# Patient Record
Sex: MALE | Race: WHITE | NOT HISPANIC OR LATINO | Employment: FULL TIME | URBAN - METROPOLITAN AREA
[De-identification: names, ages, dates, MRNs, and addresses within clinical notes are randomized per-mention and may not be internally consistent; named-entity substitution may affect disease eponyms.]

---

## 2018-03-26 ENCOUNTER — OFFICE VISIT (OUTPATIENT)
Dept: PHYSICAL MEDICINE AND REHAB | Facility: CLINIC | Age: 38
End: 2018-03-26
Payer: COMMERCIAL

## 2018-03-26 VITALS
DIASTOLIC BLOOD PRESSURE: 93 MMHG | SYSTOLIC BLOOD PRESSURE: 157 MMHG | HEART RATE: 73 BPM | WEIGHT: 235 LBS | HEIGHT: 67 IN | BODY MASS INDEX: 36.88 KG/M2

## 2018-03-26 DIAGNOSIS — M25.529 ELBOW PAIN, UNSPECIFIED LATERALITY: Primary | ICD-10-CM

## 2018-03-26 DIAGNOSIS — M77.11 RIGHT LATERAL EPICONDYLITIS: ICD-10-CM

## 2018-03-26 PROCEDURE — 99999 PR PBB SHADOW E&M-NEW PATIENT-LVL III: CPT | Mod: PBBFAC,,, | Performed by: PHYSICAL MEDICINE & REHABILITATION

## 2018-03-26 PROCEDURE — 99202 OFFICE O/P NEW SF 15 MIN: CPT | Mod: S$GLB,,, | Performed by: PHYSICAL MEDICINE & REHABILITATION

## 2018-03-26 RX ORDER — DICLOFENAC SODIUM 10 MG/G
2 GEL TOPICAL 3 TIMES DAILY
Qty: 1 TUBE | Refills: 2 | Status: ON HOLD | OUTPATIENT
Start: 2018-03-26 | End: 2018-11-16 | Stop reason: CLARIF

## 2018-03-26 NOTE — PROGRESS NOTES
[facility]    CHIEF COMPLAINT:   Chief Complaint   Patient presents with    Elbow Pain     right elbow pain     HISTORY OF PRESENT ILLNESS: Yan Watkins is a 37 y.o. male who presents to me for the first time for evaluation and treatment of right elbow pain.  He's had pain in the area for the past 2-3 weeks.  There was no specific injury or traumatic event.  He rates his pain as a 7 on a scale of 1-10.  He denies any significant swelling or redness.  He has been taking ibuprofen which helps temporarily.  He locates his pain over the lateral aspect of the right elbow.  There is no popping or snapping sensations.  The pain is increased with increased use of the right arm or hand and reduced with rest.    Review of Systems   Constitutional: Negative for fever.   HENT: Negative for drooling.    Eyes: Negative for discharge.   Respiratory: Negative for choking.    Cardiovascular: Negative for chest pain.   Genitourinary: Negative for flank pain.   Skin: Negative for wound.   Allergic/Immunologic: Negative for immunocompromised state.   Neurological: Negative for tremors and syncope.   Psychiatric/Behavioral: Negative for behavioral problems.     Past Medical History:   Past Medical History:   Diagnosis Date    Allergy        Past Surgical History: History reviewed. No pertinent surgical history.    Family History:   Family History   Problem Relation Age of Onset    Stroke Mother     COPD Mother     Stroke Father     Heart disease Father        Medications:   No current outpatient prescriptions on file prior to visit.     No current facility-administered medications on file prior to visit.        Allergies: Review of patient's allergies indicates:  No Known Allergies    Social History:   Social History     Social History    Marital status:      Spouse name: N/A    Number of children: N/A    Years of education: N/A     Social History Main Topics    Smoking status: Never Smoker  "   Smokeless tobacco: Never Used    Alcohol use Yes      Comment: socially    Drug use: Unknown    Sexual activity: Not Asked     Other Topics Concern    None     Social History Narrative    None     PHYSICAL EXAMINATION:   General    Vitals:    03/26/18 1511   BP: (!) 157/93   Pulse: 73   Weight: 106.6 kg (235 lb)   Height: 5' 7" (1.702 m)     Constitutional: Oriented to person, place, and time. No apparent distress. Appears well-developed and well-nourished. Pleasant.  HENT:   Head: Normocephalic and atraumatic.   Eyes: Right eye exhibits no discharge. Left eye exhibits no discharge. No scleral icterus.   Pulmonary/Chest: Effort normal. No respiratory distress.   Abdominal: There is no guarding.   Neurological: Alert and oriented to person, place, and time.   Psychiatric: Behavior is normal.   Right Elbow Exam     Tenderness   The patient is experiencing tenderness in the lateral epicondyle.     Range of Motion   Extension: 0   Flexion: 140   Pronation: normal   Supination: normal     Muscle Strength   Pronation:  5/5   Supination:  5/5     Tests Varus: negative  Valgus: negative  Tinel's Sign (cubital tunnel): negative    Other   Erythema: absent  Scars: absent  Sensation: normal  Pulse: present    Comments:  Positive Cozen's test      Left Elbow Exam     Tenderness   The patient is experiencing no tenderness.         Range of Motion   Extension: normal   Flexion: normal   Pronation: normal   Supination: normal     Muscle Strength   Pronation:  5/5   Supination:  5/5     Other   Erythema: absent  Scars: absent  Sensation: normal  Pulse: present        INSPECTION: There is no swelling, ecchymoses, erythema or gross deformity of the right elbow.    Imaging  Diagnostic Ultrasound Exam:  FINDINGS: Real time examination was performed. Selected static and dynamic images were obtained, and correlated with prior x-ray and other available imaging.  Limited exam of the right lateral elbow was performed today.  The " images were saved will be uploaded to EMR.  The common extensor tendon was observed in the long axis attaching upon the lateral epicondyles the humerus.  There was no lateral elbow joint effusion.  The lateral collateral ligament appear to be intact.  There was a mild degree of thickening of the proximal portion of the tendon.  It was a heterogenous appearance of the tendon substance with areas of hypoechoic echotexture within the proximal portion of the tendon.  There was a moderate degree of neovascularization seen within the tendon substance.  This area was very tender to sono palpation.     Data Reviewed: ultrasound    Supportive Actions: Independent visualization of images or test specimens    ASSESSMENT:   1. Elbow pain, unspecified laterality    2. Right lateral epicondylitis      PLAN:     1. Time was spent reviewing the above diagnosis in depth with Yan today, including acute management and rehabilitation.     2.  He has signs, symptoms, and diagnostic ultrasound findings all consistent with a diagnosis of right lateral epicondylitis.  I recommended conservative care initially in the form of formal physical therapy.  I also like the use of dry needling and scraping techniques to the area.  We will help facilitate him getting set up with physical therapy in the [place] area.      3. He does have an elbow strap and I encouraged him to use it properly.     4.  I prescribed Voltaren gel to apply topically 3 times a day as needed.    5. RTC in 6 weeks if not improved.    The above note was completed, in part, with the aid of Dragon dictation software/hardware. Translation errors may be present.

## 2018-05-11 ENCOUNTER — OFFICE VISIT (OUTPATIENT)
Dept: PHYSICAL MEDICINE AND REHAB | Facility: CLINIC | Age: 38
End: 2018-05-11
Payer: COMMERCIAL

## 2018-05-11 VITALS — BODY MASS INDEX: 36.88 KG/M2 | HEIGHT: 67 IN | WEIGHT: 235 LBS

## 2018-05-11 DIAGNOSIS — M77.11 RIGHT LATERAL EPICONDYLITIS: ICD-10-CM

## 2018-05-11 DIAGNOSIS — M25.521 RIGHT ELBOW PAIN: Primary | ICD-10-CM

## 2018-05-11 PROCEDURE — 99211 OFF/OP EST MAY X REQ PHY/QHP: CPT | Mod: 25,S$GLB,, | Performed by: PHYSICAL MEDICINE & REHABILITATION

## 2018-05-11 PROCEDURE — 99999 PR PBB SHADOW E&M-EST. PATIENT-LVL II: CPT | Mod: PBBFAC,,, | Performed by: PHYSICAL MEDICINE & REHABILITATION

## 2018-05-11 PROCEDURE — 20551 NJX 1 TENDON ORIGIN/INSJ: CPT | Mod: S$GLB,,, | Performed by: PHYSICAL MEDICINE & REHABILITATION

## 2018-05-11 PROCEDURE — 76942 ECHO GUIDE FOR BIOPSY: CPT | Mod: S$GLB,,, | Performed by: PHYSICAL MEDICINE & REHABILITATION

## 2018-05-11 RX ORDER — GLUCOSAMINE/CHONDR SU A SOD 167-133 MG
CAPSULE ORAL
COMMUNITY

## 2018-05-11 RX ORDER — BETAMETHASONE SODIUM PHOSPHATE AND BETAMETHASONE ACETATE 3; 3 MG/ML; MG/ML
6 INJECTION, SUSPENSION INTRA-ARTICULAR; INTRALESIONAL; INTRAMUSCULAR; SOFT TISSUE
Status: DISCONTINUED | OUTPATIENT
Start: 2018-05-11 | End: 2018-05-11 | Stop reason: HOSPADM

## 2018-05-11 RX ADMIN — BETAMETHASONE SODIUM PHOSPHATE AND BETAMETHASONE ACETATE 6 MG: 3; 3 INJECTION, SUSPENSION INTRA-ARTICULAR; INTRALESIONAL; INTRAMUSCULAR; SOFT TISSUE at 12:05

## 2018-05-11 NOTE — PROGRESS NOTES
[facility]    CHIEF COMPLAINT:   Chief Complaint   Patient presents with    Elbow Pain     right     HISTORY OF PRESENT ILLNESS: Yan Watkins is a 37 y.o. male who presents to me in follow-up regarding his right elbow pain.  At his last visit we had him established in formal physical therapy.  He has been doing this and completed 8 sessions.  They've been doing therapeutic exercise, stretching, dry needling, and medial scraping techniques.  He feels that he is made some progress, but still experiences consistent right elbow pain when using the right arm.    Review of Systems   Constitutional: Negative for fever.   HENT: Negative for drooling.    Eyes: Negative for discharge.   Respiratory: Negative for choking.    Cardiovascular: Negative for chest pain.   Genitourinary: Negative for flank pain.   Skin: Negative for wound.   Allergic/Immunologic: Negative for immunocompromised state.   Neurological: Negative for tremors and syncope.   Psychiatric/Behavioral: Negative for behavioral problems.     Past Medical History:   Past Medical History:   Diagnosis Date    Allergy        Past Surgical History: History reviewed. No pertinent surgical history.    Family History:   Family History   Problem Relation Age of Onset    Stroke Mother     COPD Mother     Stroke Father     Heart disease Father        Medications:   Current Outpatient Prescriptions on File Prior to Visit   Medication Sig Dispense Refill    diclofenac sodium (VOLTAREN) 1 % Gel Apply 2 g topically 3 (three) times daily. 1 Tube 2     No current facility-administered medications on file prior to visit.        Allergies: Review of patient's allergies indicates:  No Known Allergies    Social History:   Social History     Social History    Marital status:      Spouse name: N/A    Number of children: N/A    Years of education: N/A     Social History Main Topics    Smoking status: Never Smoker    Smokeless tobacco:  "Never Used    Alcohol use Yes      Comment: socially    Drug use: Unknown    Sexual activity: Not Asked     Other Topics Concern    None     Social History Narrative    None     PHYSICAL EXAMINATION:   General    Vitals:    05/11/18 1128   Weight: 106.6 kg (235 lb)   Height: 5' 7" (1.702 m)     Constitutional: Oriented to person, place, and time. No apparent distress. Appears well-developed and well-nourished. Pleasant.  HENT:   Head: Normocephalic and atraumatic.   Eyes: Right eye exhibits no discharge. Left eye exhibits no discharge. No scleral icterus.   Pulmonary/Chest: Effort normal. No respiratory distress.   Abdominal: There is no guarding.   Neurological: Alert and oriented to person, place, and time.   Psychiatric: Behavior is normal.   Right Elbow Exam     Tenderness   The patient is experiencing tenderness in the lateral epicondyle.     Range of Motion   Extension: 0   Flexion: 140   Pronation: normal   Supination: normal     Muscle Strength   Pronation:  5/5   Supination:  5/5     Tests Varus: negative  Valgus: negative  Tinel's Sign (cubital tunnel): negative    Other   Erythema: absent  Scars: absent  Sensation: normal  Pulse: present    Comments:  Positive Cozen's test      Left Elbow Exam     Tenderness   The patient is experiencing no tenderness.         Range of Motion   Extension: normal   Flexion: normal   Pronation: normal   Supination: normal     Muscle Strength   Pronation:  5/5   Supination:  5/5     Other   Erythema: absent  Scars: absent  Sensation: normal  Pulse: present        INSPECTION: There is no swelling, ecchymoses, erythema or gross deformity of the right elbow.    Imaging  Diagnostic Ultrasound Exam:  FINDINGS: Real time examination was performed. Selected static and dynamic images were obtained, and correlated with prior x-ray and other available imaging.  Limited exam of the right lateral elbow was performed today.  The images were saved will be uploaded to EMR.  The common " extensor tendon was observed in the long axis attaching upon the lateral epicondyle of the humerus.  There was no lateral elbow joint effusion.  The lateral collateral ligament appears to be intact.  There was a mild degree of thickening of the proximal portion of the tendon.  It was a heterogenous appearance of the tendon substance with areas of hypoechoic echotexture within the proximal portion of the tendon.  There was a moderate degree of neovascularization seen within the tendon substance.  This area was very tender to sono palpation.     Data Reviewed: ultrasound    Supportive Actions: Independent visualization of images or test specimens    ASSESSMENT:   1. Right elbow pain    2. Right lateral epicondylitis      PLAN:     1.  Unfortunately, he has responded minimally to conservative treatment thus far.  Repeat diagnostic ultrasound exam today showed persistent abnormalities of the common extensor tendon.  We also discussed that maybe some partial tearing of the lateral collateral ligament, although he has no laxity on exam.  We discussed that due to the significant tissue abnormalities visualized on ultrasound, further intratendinous interventions will likely be needed to produce more permanent pain resolution.  We discussed percutaneous needle fenestration, Tenex procedure, and platelet rich plasma injection.  We also discussed injection of corticosteroid.  After discussion we elected to proceed with injection of cortical steroid under ultrasound guidance to the right common extensor tendon, see separate procedure note.    2.  At this point, we will monitor his response to the injection of corticosteroid.  If he does not get satisfactory results, her next course of action will likely be to proceed with the Tenex procedure.    The above note was completed, in part, with the aid of Dragon dictation software/hardware. Translation errors may be present.

## 2018-05-11 NOTE — PROCEDURES
Tendon Origin  Date/Time: 5/11/2018 12:25 PM  Performed by: MIKE SANTACRUZ  Authorized by: MIKE SANTACRUZ     Consent Done?:  Yes (Verbal)  Timeout: prior to procedure the correct patient, procedure, and site was verified    Indications:  Pain  Site marked: the procedure site was marked    Timeout: prior to procedure the correct patient, procedure, and site was verified    Location:  Elbow  Site:  R elbow (Common extensor tendon)  Prep: patient was prepped and draped in usual sterile fashion    Ultrasonic Guidance for Needle Placement?: Yes    Needle size:  25 G  Approach: Needle in plane, distal to proximal.  Medications:  6 mg betamethasone acetate-betamethasone sodium phosphate 6 mg/mL  Patient tolerance:  Patient tolerated the procedure well with no immediate complications   Ultrasound guidance was used for correct needle placement, the images were saved will be uploaded to EMR.

## 2018-07-26 ENCOUNTER — OFFICE VISIT (OUTPATIENT)
Dept: PHYSICAL MEDICINE AND REHAB | Facility: CLINIC | Age: 38
End: 2018-07-26
Payer: COMMERCIAL

## 2018-07-26 DIAGNOSIS — M77.11 RIGHT LATERAL EPICONDYLITIS: Primary | ICD-10-CM

## 2018-07-26 PROCEDURE — 20551 NJX 1 TENDON ORIGIN/INSJ: CPT | Mod: S$GLB,,, | Performed by: PHYSICAL MEDICINE & REHABILITATION

## 2018-07-26 PROCEDURE — 99211 OFF/OP EST MAY X REQ PHY/QHP: CPT | Mod: 25,S$GLB,, | Performed by: PHYSICAL MEDICINE & REHABILITATION

## 2018-07-26 PROCEDURE — 76942 ECHO GUIDE FOR BIOPSY: CPT | Mod: S$GLB,,, | Performed by: PHYSICAL MEDICINE & REHABILITATION

## 2018-07-26 PROCEDURE — 99999 PR PBB SHADOW E&M-EST. PATIENT-LVL I: CPT | Mod: PBBFAC,,, | Performed by: PHYSICAL MEDICINE & REHABILITATION

## 2018-07-26 RX ORDER — BETAMETHASONE SODIUM PHOSPHATE AND BETAMETHASONE ACETATE 3; 3 MG/ML; MG/ML
6 INJECTION, SUSPENSION INTRA-ARTICULAR; INTRALESIONAL; INTRAMUSCULAR; SOFT TISSUE
Status: DISCONTINUED | OUTPATIENT
Start: 2018-07-26 | End: 2018-07-27 | Stop reason: HOSPADM

## 2018-07-26 RX ADMIN — BETAMETHASONE SODIUM PHOSPHATE AND BETAMETHASONE ACETATE 6 MG: 3; 3 INJECTION, SUSPENSION INTRA-ARTICULAR; INTRALESIONAL; INTRAMUSCULAR; SOFT TISSUE at 05:07

## 2018-07-27 NOTE — PROCEDURES
Tendon Origin  Date/Time: 7/26/2018 5:47 PM  Performed by: MIKE SANTACRUZ  Authorized by: MIKE SANTACRUZ     Consent Done?:  Yes (Verbal)  Timeout: prior to procedure the correct patient, procedure, and site was verified    Indications:  Pain  Site marked: the procedure site was marked    Timeout: prior to procedure the correct patient, procedure, and site was verified    Location:  Elbow  Site:  R elbow (CET)  Prep: patient was prepped and draped in usual sterile fashion    Ultrasonic Guidance for Needle Placement?: Yes    Needle size:  25 G  Approach: needle in plane, dist to prox.  Medications:  6 mg betamethasone acetate-betamethasone sodium phosphate 6 mg/mL  Patient tolerance:  Patient tolerated the procedure well with no immediate complications   Ultrasound guidance was used for correct needle placement, the images were saved will be uploaded to EMR.

## 2018-07-27 NOTE — PROGRESS NOTES
[facility]    CHIEF COMPLAINT:   Chief Complaint   Patient presents with    Elbow Pain     right     HISTORY OF PRESENT ILLNESS: Yan Watkins is a 37 y.o. male who presents to me in follow-up regarding his right elbow pain.  At his last visit we performed ultrasound-guided injection of corticosteroid to the right common extensor tendon.  He reports full resolution of his symptoms following that injection.  The pain relief lasted up until about 1-2 weeks ago.  The pain returned insidiously without any specific injury.  The pain is the same location and character as previous.  He has medication said starting this week and presents today to discuss options for both immediate and long-term pain relief.    Review of Systems   Constitutional: Negative for fever.   HENT: Negative for drooling.    Eyes: Negative for discharge.   Respiratory: Negative for choking.    Cardiovascular: Negative for chest pain.   Genitourinary: Negative for flank pain.   Skin: Negative for wound.   Allergic/Immunologic: Negative for immunocompromised state.   Neurological: Negative for tremors and syncope.   Psychiatric/Behavioral: Negative for behavioral problems.     Past Medical History:   Past Medical History:   Diagnosis Date    Allergy        Past Surgical History: No past surgical history on file.    Family History:   Family History   Problem Relation Age of Onset    Stroke Mother     COPD Mother     Stroke Father     Heart disease Father        Medications:   Current Outpatient Prescriptions on File Prior to Visit   Medication Sig Dispense Refill    multivit-minerals/ferrous fum (MULTI VITAMIN ORAL) Multi Vitamin   one daily      niacin 500 MG Tab niacin   one daily      OMEGA-3S-DHA-EPA-FISH OIL ORAL Fish Oil   one daily      diclofenac sodium (VOLTAREN) 1 % Gel Apply 2 g topically 3 (three) times daily. 1 Tube 2     No current facility-administered medications on file prior to visit.         Allergies: Review of patient's allergies indicates:  No Known Allergies    Social History:   Social History     Social History    Marital status:      Spouse name: N/A    Number of children: N/A    Years of education: N/A     Social History Main Topics    Smoking status: Never Smoker    Smokeless tobacco: Never Used    Alcohol use Yes      Comment: socially    Drug use: Unknown    Sexual activity: Not on file     Other Topics Concern    Not on file     Social History Narrative    No narrative on file     PHYSICAL EXAMINATION:   General  VSS  Constitutional: Oriented to person, place, and time. No apparent distress. Appears well-developed and well-nourished. Pleasant.  HENT:   Head: Normocephalic and atraumatic.   Eyes: Right eye exhibits no discharge. Left eye exhibits no discharge. No scleral icterus.   Pulmonary/Chest: Effort normal. No respiratory distress.   Abdominal: There is no guarding.   Neurological: Alert and oriented to person, place, and time.   Psychiatric: Behavior is normal.   Right Elbow Exam     Tenderness   The patient is experiencing tenderness in the lateral epicondyle.     Range of Motion   Extension: 0   Flexion: 140   Pronation: normal   Supination: normal     Muscle Strength   Pronation:  5/5   Supination:  5/5     Tests Varus: negative  Valgus: negative  Tinel's Sign (cubital tunnel): negative    Other   Erythema: absent  Scars: absent  Sensation: normal  Pulse: present    Comments:  Positive Cozen's test      Left Elbow Exam     Tenderness   The patient is experiencing no tenderness.         Range of Motion   Extension: normal   Flexion: normal   Pronation: normal   Supination: normal     Muscle Strength   Pronation:  5/5   Supination:  5/5     Other   Erythema: absent  Scars: absent  Sensation: normal  Pulse: present        INSPECTION: There is no swelling, ecchymoses, erythema or gross deformity of the right elbow.    Imaging  Diagnostic Ultrasound Exam:  FINDINGS:  Real time examination was performed. Selected static and dynamic images were obtained, and correlated with prior x-ray and other available imaging.  Limited exam of the right lateral elbow was performed today.  The images were saved will be uploaded to EMR.  The common extensor tendon was observed in the long axis attaching upon the lateral epicondyle of the humerus.  There was no lateral elbow joint effusion.  The lateral collateral ligament appears to be intact.  There was a mild degree of thickening of the proximal portion of the tendon.  It was a heterogenous appearance of the tendon substance with areas of hypoechoic echotexture within the proximal portion of the tendon.  There was a moderate degree of neovascularization seen within the tendon substance.  This area was very tender to sono palpation.     Data Reviewed: ultrasound    Supportive Actions: Independent visualization of images or test specimens    ASSESSMENT:   1. Right lateral epicondylitis      PLAN:     1.  Repeat diagnostic ultrasound exam today of the right common extensor tendon showed persistent tissue abnormalities.  We discussed that repeat injections of corticosteroid cannot be expected to promote permanent pain relief.  Due to his upcoming vacation, I did agree to perform another injection of corticosteroid to give him some immediate relief, see separate procedure note.    2.  We discussed other intratendinous options for potential longer-term relief.  We focused on platelet rich plasma and the Tenex procedure.  Due to the significant degree of tissue abnormalities including potential tendon tearing, I recommended the Tenex procedure and subsequent injection of PRP to promote the highest likelihood of long-term recovery.  We discussed a gradual course of improvement over 6-12 weeks.  Discussed the potential for continued pain despite these measures.    3.  He will contact us if or when today's injection of steroid wears off to schedule the  Tenex procedure.    The above note was completed, in part, with the aid of Dragon dictation software/hardware. Translation errors may be present.

## 2018-10-11 ENCOUNTER — TELEPHONE (OUTPATIENT)
Dept: PHYSICAL MEDICINE AND REHAB | Facility: CLINIC | Age: 38
End: 2018-10-11

## 2018-10-11 DIAGNOSIS — M25.529 ELBOW PAIN, UNSPECIFIED LATERALITY: Primary | ICD-10-CM

## 2018-10-11 NOTE — TELEPHONE ENCOUNTER
----- Message from Sonny Shukla MD sent at 10/11/2018  8:44 AM CDT -----  Please schedule an MRI of the right elbow.

## 2018-10-29 ENCOUNTER — TELEPHONE (OUTPATIENT)
Dept: PHYSICAL MEDICINE AND REHAB | Facility: CLINIC | Age: 38
End: 2018-10-29

## 2018-10-29 NOTE — TELEPHONE ENCOUNTER
----- Message from Sonny Shukla MD sent at 10/23/2018  6:30 PM CDT -----  Please schedule my boy for tenex of the right elbow on Nov 16.

## 2018-11-01 DIAGNOSIS — M77.11 RIGHT LATERAL EPICONDYLITIS: Primary | ICD-10-CM

## 2018-11-01 RX ORDER — LIDOCAINE HYDROCHLORIDE 10 MG/ML
1 INJECTION, SOLUTION EPIDURAL; INFILTRATION; INTRACAUDAL; PERINEURAL ONCE
Status: CANCELLED | OUTPATIENT
Start: 2018-11-01 | End: 2018-11-01

## 2018-11-01 RX ORDER — MIDAZOLAM HYDROCHLORIDE 5 MG/ML
2 INJECTION INTRAMUSCULAR; INTRAVENOUS ONCE AS NEEDED
Status: CANCELLED | OUTPATIENT
Start: 2018-11-01 | End: 2018-11-01

## 2018-11-01 RX ORDER — SODIUM CHLORIDE, SODIUM LACTATE, POTASSIUM CHLORIDE, CALCIUM CHLORIDE 600; 310; 30; 20 MG/100ML; MG/100ML; MG/100ML; MG/100ML
INJECTION, SOLUTION INTRAVENOUS CONTINUOUS
Status: CANCELLED | OUTPATIENT
Start: 2018-11-01

## 2018-11-16 ENCOUNTER — HOSPITAL ENCOUNTER (OUTPATIENT)
Facility: HOSPITAL | Age: 38
Discharge: HOME | End: 2018-11-16
Attending: PHYSICAL MEDICINE & REHABILITATION | Admitting: PHYSICAL MEDICINE & REHABILITATION
Payer: COMMERCIAL

## 2018-11-16 DIAGNOSIS — M77.11 RIGHT LATERAL EPICONDYLITIS: ICD-10-CM

## 2018-11-16 PROCEDURE — 36000705 HC OR TIME LEV I EA ADD 15 MIN: Mod: PO | Performed by: PHYSICAL MEDICINE & REHABILITATION

## 2018-11-16 PROCEDURE — 71000015 HC POSTOP RECOV 1ST HR: Mod: PO | Performed by: PHYSICAL MEDICINE & REHABILITATION

## 2018-11-16 PROCEDURE — 27201423 OPTIME MED/SURG SUP & DEVICES STERILE SUPPLY: Mod: PO | Performed by: PHYSICAL MEDICINE & REHABILITATION

## 2018-11-16 PROCEDURE — 63600175 PHARM REV CODE 636 W HCPCS: Mod: PO | Performed by: PHYSICAL MEDICINE & REHABILITATION

## 2018-11-16 PROCEDURE — 36000704 HC OR TIME LEV I 1ST 15 MIN: Mod: PO | Performed by: PHYSICAL MEDICINE & REHABILITATION

## 2018-11-16 PROCEDURE — 76942 ECHO GUIDE FOR BIOPSY: CPT | Mod: 26,,, | Performed by: PHYSICAL MEDICINE & REHABILITATION

## 2018-11-16 PROCEDURE — 25000003 PHARM REV CODE 250: Mod: PO | Performed by: PHYSICAL MEDICINE & REHABILITATION

## 2018-11-16 PROCEDURE — 24357 REPAIR ELBOW PERC: CPT | Mod: RT,,, | Performed by: PHYSICAL MEDICINE & REHABILITATION

## 2018-11-16 RX ORDER — SODIUM CHLORIDE, SODIUM LACTATE, POTASSIUM CHLORIDE, CALCIUM CHLORIDE 600; 310; 30; 20 MG/100ML; MG/100ML; MG/100ML; MG/100ML
INJECTION, SOLUTION INTRAVENOUS CONTINUOUS
Status: DISCONTINUED | OUTPATIENT
Start: 2018-11-16 | End: 2018-11-16 | Stop reason: HOSPADM

## 2018-11-16 RX ORDER — MIDAZOLAM HYDROCHLORIDE 1 MG/ML
2 INJECTION INTRAMUSCULAR; INTRAVENOUS ONCE AS NEEDED
Status: DISCONTINUED | OUTPATIENT
Start: 2018-11-16 | End: 2018-11-16 | Stop reason: HOSPADM

## 2018-11-16 RX ORDER — HYDROCODONE BITARTRATE AND ACETAMINOPHEN 5; 325 MG/1; MG/1
1 TABLET ORAL EVERY 4 HOURS PRN
Qty: 8 TABLET | Refills: 0 | Status: SHIPPED | OUTPATIENT
Start: 2018-11-16 | End: 2019-07-29

## 2018-11-16 RX ORDER — MIDAZOLAM HYDROCHLORIDE 1 MG/ML
INJECTION INTRAMUSCULAR; INTRAVENOUS
Status: DISCONTINUED | OUTPATIENT
Start: 2018-11-16 | End: 2018-11-16 | Stop reason: HOSPADM

## 2018-11-16 RX ORDER — LIDOCAINE HYDROCHLORIDE 10 MG/ML
INJECTION INFILTRATION; PERINEURAL
Status: DISCONTINUED | OUTPATIENT
Start: 2018-11-16 | End: 2018-11-16 | Stop reason: HOSPADM

## 2018-11-16 RX ORDER — LIDOCAINE HYDROCHLORIDE 10 MG/ML
1 INJECTION, SOLUTION EPIDURAL; INFILTRATION; INTRACAUDAL; PERINEURAL ONCE
Status: DISCONTINUED | OUTPATIENT
Start: 2018-11-16 | End: 2018-11-16 | Stop reason: HOSPADM

## 2018-11-16 NOTE — OP NOTE
Operative Note       Surgery Date: 11/16/2018     Surgeon(s) and Role:     * Sonny Shukla MD - Primary    Pre-op Diagnosis:  Right lateral epicondylitis [M77.11]    Post-op Diagnosis: Post-Op Diagnosis Codes:     * Right lateral epicondylitis [M77.11]    Procedure(s) (LRB):  Ultrasonic percutaneous tenotomy of the right common extensor tendon (Right)    Anesthesia: RN IV Sedation    Procedure in Detail/Findings:    Indications:       This is a 37 y.o. male with recalcitrant right common extensor tendon tendinosis who presents for elective percutaneous tenotomy of the right elbow. We discussed the risk, benefits and alternatives, and he elected to proceed.       Description of Procedure:       Once he was brought to the operating room, a time-out was performed confirming the name, the location and the procedure. The patient was the patient was placed in the supine position with the right elbow flexed to about 40 degrees. The skin overlying the right common extensor tendon was prepped using a ChloraPrep solution. Using ultrasound, the proximal common extensor tendon was observed to be hypoechoic and heterogenous, consistent with a diagnosis of medial epicondylosis. There was also a hypoechoic defect within the tendon substance consistent with tendon tearing. After diffuse administration of lidocaine 1%, a #11 blade was used to make a small incision. A 18g angiocath was then used to create a tract for the Tenex device. Ultrasound was used to locate the ulnar nerve and avoid this structure. The TX1 handpiece was then introduced down to the tendon defects. With 2 minutes and 17 seconds of energy time, the tendon defect was ablated. The Tenex hand piece was then removed. A sterile compression dressing was applied with an ACE wrap. The patient was returned to the recovery area in good condition. He was instructed to not lift more than 5 lbs untilcleared by me. We will follow up with him in two weeks in the clinic  to discuss postoperative protocol.    Estimated Blood Loss: Minimal           Specimens (From admission, onward)    None                    Condition: Good    Attestation:  I performed the procedure.           Discharge Note    Admit Date: 11/16/2018    Attending Physician: Sonny Shukla MD     Discharge Physician: Sonny Shukla MD    Final Diagnosis: Post-Op Diagnosis Codes:     * Right lateral epicondylitis [M77.11]    Disposition: Home or Self Care, pt discharged and will f/u in clinic in 2 weeks.    Patient Instructions:   Current Discharge Medication List      CONTINUE these medications which have NOT CHANGED    Details   multivit-minerals/ferrous fum (MULTI VITAMIN ORAL) Multi Vitamin   one daily      niacin 500 MG Tab niacin   one daily      OMEGA-3S-DHA-EPA-FISH OIL ORAL Fish Oil   one daily      TURMERIC ORAL Take by mouth.             Discharge Procedure Orders (must include Diet, Follow-up, Activity)   Discharge Procedure Orders (must include Diet, Follow-up, Activity)   Diet general     Ice to affected area     Call MD for:  temperature >100.4     Call MD for:  persistent nausea and vomiting     Call MD for:  severe uncontrolled pain     Call MD for:  difficulty breathing, headache or visual disturbances     Call MD for:  redness, tenderness, or signs of infection (pain, swelling, redness, odor or green/yellow discharge around incision site)     Call MD for:  hives     Call MD for:  persistent dizziness or light-headedness     Call MD for:  extreme fatigue     Remove dressing in 48 hours     Shower on day dressing removed (No bath)        Discharge Date: 11/16/18

## 2018-11-16 NOTE — PLAN OF CARE
PIV removed.  Pressure dressing applied.  Discharge instructions reviewed with patient.  All questions answered.  D/C via wheelchair to personal vehicle.

## 2018-11-16 NOTE — DISCHARGE INSTRUCTIONS
Recovery After Procedural Sedation (Adult)  You have been given medicine by vein to make you sleep during your surgery. This may have included both a pain medicine and sleeping medicine. Most of the effects have worn off. But you may still have some drowsiness for the next 6 to 8 hours.  Home care  Follow these guidelines when you get home:  · For the next 8 hours, you should be watched by a responsible adult. This person should make sure your condition is not getting worse.  · Don't drink any alcohol for the next 24 hours.  · Don't drive, operate dangerous machinery, or make important business or personal decisions during the next 24 hours.  Note: Your healthcare provider may tell you not to take any medicine by mouth for pain or sleep in the next 4 hours. These medicines may react with the medicines you were given in the hospital. This could cause a much stronger response than usual.  Follow-up care  Follow up with your healthcare provider if you are not alert and back to your usual level of activity within 12 hours.  When to seek medical advice  Call your healthcare provider right away if any of these occur:  · Drowsiness gets worse  · Weakness or dizziness gets worse  · Repeated vomiting  · You can't be awakened   Date Last Reviewed: 10/18/2016  © 5946-0112 [employer]. [address] [place] [address], [place] [address]. All rights reserved. This information is not intended as a substitute for professional medical care. Always follow your healthcare professional's instructions.

## 2018-11-19 VITALS
TEMPERATURE: 98 F | SYSTOLIC BLOOD PRESSURE: 132 MMHG | RESPIRATION RATE: 18 BRPM | WEIGHT: 230 LBS | HEIGHT: 67 IN | DIASTOLIC BLOOD PRESSURE: 66 MMHG | HEART RATE: 65 BPM | BODY MASS INDEX: 36.1 KG/M2 | OXYGEN SATURATION: 97 %

## 2025-04-26 NOTE — H&P
CHIEF COMPLAINT:        Chief Complaint   Patient presents with    Elbow Pain       right      HISTORY OF PRESENT ILLNESS: Yan Watkins is a 37 y.o. male who presents to me for elective Tenex procedure to the right CET.     Review of Systems   Constitutional: Negative for fever.   HENT: Negative for drooling.    Eyes: Negative for discharge.   Respiratory: Negative for choking.    Cardiovascular: Negative for chest pain.   Genitourinary: Negative for flank pain.   Skin: Negative for wound.   Allergic/Immunologic: Negative for immunocompromised state.   Neurological: Negative for tremors and syncope.   Psychiatric/Behavioral: Negative for behavioral problems.      Past Medical History:        Past Medical History:   Diagnosis Date    Allergy           Past Surgical History: No past surgical history on file.     Family History:         Family History   Problem Relation Age of Onset    Stroke Mother      COPD Mother      Stroke Father      Heart disease Father           Medications:          Current Outpatient Prescriptions on File Prior to Visit   Medication Sig Dispense Refill    multivit-minerals/ferrous fum (MULTI VITAMIN ORAL) Multi Vitamin   one daily        niacin 500 MG Tab niacin   one daily        OMEGA-3S-DHA-EPA-FISH OIL ORAL Fish Oil   one daily        diclofenac sodium (VOLTAREN) 1 % Gel Apply 2 g topically 3 (three) times daily. 1 Tube 2      No current facility-administered medications on file prior to visit.          Allergies: Review of patient's allergies indicates:  No Known Allergies     Social History:   Social History            Social History    Marital status:        Spouse name: N/A    Number of children: N/A    Years of education: N/A               Social History Main Topics      Smoking status: Never Smoker     Smokeless tobacco: Never Used     Alcohol use Yes          Comment: socially      Drug use: Unknown    Sexual activity: Not on file            Other  Donna Larose-    Your vaginal swab came back positive for trichomonas.  This is a sexually transmitted infection that we can treat effectively with antibiotics.  I have sent in a prescription for metronidazole to treat this.  Please take 1 pill 2x daily for 1 week.  The rest of your tests are negative.  Please call the clinic at [phone] if you have any questions.      Cammie Bond MD    Please send results to patient.     Topics Concern    Not on file          Social History Narrative    No narrative on file      PHYSICAL EXAMINATION:   General  VSS  Constitutional: Oriented to person, place, and time. No apparent distress. Appears well-developed and well-nourished. Pleasant.  HENT:   Head: Normocephalic and atraumatic.   Eyes: Right eye exhibits no discharge. Left eye exhibits no discharge. No scleral icterus.   Pulmonary/Chest: Effort normal. No respiratory distress.   Abdominal: There is no guarding.   Neurological: Alert and oriented to person, place, and time.   Psychiatric: Behavior is normal.   Right Elbow Exam      Tenderness   The patient is experiencing tenderness in the lateral epicondyle.      Range of Motion   Extension: 0   Flexion: 140   Pronation: normal   Supination: normal      Muscle Strength   Pronation:  5/5   Supination:  5/5      Tests Varus: negative  Valgus: negative  Tinel's Sign (cubital tunnel): negative     Other   Erythema: absent  Scars: absent  Sensation: normal  Pulse: present     Comments:  Positive Cozen's test        Left Elbow Exam      Tenderness   The patient is experiencing no tenderness.            Range of Motion   Extension: normal   Flexion: normal   Pronation: normal   Supination: normal      Muscle Strength   Pronation:  5/5   Supination:  5/5      Other   Erythema: absent  Scars: absent  Sensation: normal  Pulse: present      INSPECTION: There is no swelling, ecchymoses, erythema or gross deformity of the right elbow.     ASSESSMENT:   1. Right lateral epicondylitis       PLAN:      1.  We will proceed today with Tenex procedure to the right CET.     2.  He will f/u in 2 weeks.

## (undated) DEVICE — CUP MEDICINE STERILE 2OZ

## (undated) DEVICE — GLOVE SURGICAL LATEX SZ 8

## (undated) DEVICE — LABEL FOR UTILITY MARKER

## (undated) DEVICE — KIT COVER GENERAL PURPOSE

## (undated) DEVICE — MARKER SKIN STND TIP BLUE BARR

## (undated) DEVICE — SEE MEDLINE ITEM 152622

## (undated) DEVICE — APPLICATOR CHLORAPREP CLR 10.5

## (undated) DEVICE — SOD CL BACT LS .09% 10 ML

## (undated) DEVICE — SEE L#120831

## (undated) DEVICE — NDL 27G X 1 1/4

## (undated) DEVICE — CATH INTROCAN CANN 18G 1-3/4IN

## (undated) DEVICE — PACK TX1 TISSUE REMOVAL SYS

## (undated) DEVICE — SEE MEDLINE ITEM 157128

## (undated) DEVICE — SPONGE DERMACEA 4X4IN 12PLY